# Patient Record
Sex: FEMALE | Race: WHITE
[De-identification: names, ages, dates, MRNs, and addresses within clinical notes are randomized per-mention and may not be internally consistent; named-entity substitution may affect disease eponyms.]

---

## 2021-08-30 ENCOUNTER — HOSPITAL ENCOUNTER (EMERGENCY)
Dept: HOSPITAL 97 - ER | Age: 81
Discharge: HOME | End: 2021-08-30
Payer: COMMERCIAL

## 2021-08-30 VITALS — OXYGEN SATURATION: 97 % | TEMPERATURE: 97.8 F | SYSTOLIC BLOOD PRESSURE: 142 MMHG | DIASTOLIC BLOOD PRESSURE: 85 MMHG

## 2021-08-30 DIAGNOSIS — K62.3: Primary | ICD-10-CM

## 2021-08-30 PROCEDURE — 99281 EMR DPT VST MAYX REQ PHY/QHP: CPT

## 2021-08-30 NOTE — EDPHYS
Physician Documentation                                                                           

 [facility]                                                                 

Name: Charito Mcgee                                                                                  

Age: 80 yrs                                                                                       

Sex: Female                                                                                       

: 1940                                                                                   

MRN: [id number]                                                                                   

Arrival Date: 2021                                                                          

Time: 10:48                                                                                       

Account#: [id number]                                                                            

Bed 11                                                                                            

Private MD: Troy Castellanos T                                                                        

ED Physician Chalo Birmingham                                                                      

HPI:                                                                                              

                                                                                             

14:10 This 80 yrs old  Female presents to ER via Ambulatory with complaints of       kb  

      Vaginal Problem.                                                                            

14:11 The patient presents with ball coming out of vagina. Onset: The symptoms/episode        kb  

      began/occurred 1 week(s) ago. Modifying factors: The symptoms are alleviated by             

      nothing, the symptoms are aggravated by nothing. Associated signs and symptoms: The         

      patient has no apparent associated signs or symptoms. Severity of symptoms: At their        

      worst the symptoms were very mild, in the emergency department the symptoms are             

      unchanged. The patient has not experienced similar symptoms in the past. The patient        

      has not recently seen a physician. Pt states she felt a small ball, the size of a           

      marble, coming out of vagina. States it goes back in on it's own. No pain, tenderness. .    

                                                                                                  

Historical:                                                                                       

- Allergies:                                                                                      

11:26 No Known Allergies;                                                                     hb  

                                                                                                  

- Immunization history:: Client reports receiving the 2nd dose of the Covid vaccine,              

  Date received: 2021.                                                                  

- Social history:: Smoking status: Patient denies any tobacco usage or history of.                

                                                                                                  

                                                                                                  

ROS:                                                                                              

14:08 Constitutional: Negative for fever, chills, and weight loss.                            kb  

14:08 : Positive for ball coming out of vagina intermittently for a week.                       

14:08 All other systems are negative.                                                             

                                                                                                  

Exam:                                                                                             

14:10 Constitutional:  This is a well developed, well nourished patient who is awake, alert,  kb  

      and in no acute distress. Head/Face:  Normocephalic, atraumatic. ENT:  Moist Mucous         

      membranes Respiratory:  Respirations even and unlabored. No increased work of               

      breathing, no retractions or nasal flaring. Abdomen/GI:  Soft, non-tender. No               

      distention Female :  Normal external genitalia. Skin:  Warm, dry with normal turgor.      

      Normal color. MS/ Extremity:  Pulses equal, no cyanosis.  Neurovascular intact.  Full,      

      normal range of motion. Neuro:  Awake and alert, GCS 15, oriented to person, place,         

      time, and situation. Moves all extremities. Normal gait. Psych:  Awake, alert, with         

      orientation to person, place and time.  Behavior, mood, and affect are within normal        

      limits.                                                                                     

14:10 Abdomen/GI: Rectal exam: swelling, is not appreciated, tenderness, is not appreciated,      

      fecal impaction, is not appreciated, the exam is chaperoned by the nurse, rectal            

      prolapse, able to reduce with ease and no pain.                                             

                                                                                                  

Vital Signs:                                                                                      

11:25  / 85; Pulse 68; Resp 16; Temp 97.8; Pulse Ox 97% on R/A; Weight 58.51 kg; Height hb  

      5 ft. 1 in. (154.94 cm); Pain 0/10;                                                         

11:25 Body Mass Index 24.37 (58.51 kg, 154.94 cm)                                             hb  

                                                                                                  

MDM:                                                                                              

13:41 Patient medically screened.                                                             kb  

14:07 Data reviewed: vital signs, nurses notes. Data interpreted: Pulse oximetry: on room air kb  

      is 97 %. Interpretation: normal. Counseling: I had a detailed discussion with the           

      patient and/or guardian regarding: the historical points, exam findings, and any            

      diagnostic results supporting the discharge/admit diagnosis, the need for outpatient        

      follow up, a general surgeon, to return to the emergency department if symptoms worsen      

      or persist or if there are any questions or concerns that arise at home.                    

                                                                                                  

                                                                                             

13:42 Order name: Pelvic Exam Setup; Complete Time: 14:15                                     kb  

                                                                                                  

Administered Medications:                                                                         

No medications were administered                                                                  

                                                                                                  

                                                                                                  

Disposition:                                                                                      

                                                                                             

07:45 Co-signature as Attending Physician, Chalo Birmingham MD I agree with the assessment and  clint 

      plan of care.                                                                               

                                                                                                  

Disposition Summary:                                                                              

21 14:12                                                                                    

Discharge Ordered                                                                                 

      Location: Home                                                                          kb  

      Condition: Stable                                                                       kb  

      Diagnosis                                                                                   

        - Rectal prolapse                                                                     kb  

      Followup:                                                                               kb  

        - With: Emergency Department                                                               

        - When: As needed                                                                          

        - Reason: Worsening of condition                                                           

      Followup:                                                                               kb  

        - With: Private Physician                                                                  

        - When: 2 - 3 days                                                                         

        - Reason: Recheck today's complaints, Continuance of care, Re-evaluation by your           

      physician                                                                                   

      Discharge Instructions:                                                                     

        - Discharge Summary Sheet                                                             kb  

        - Rectal Prolapse, Adult                                                              kb  

      Forms:                                                                                      

        - Medication Reconciliation Form                                                      kb  

        - Thank You Letter                                                                    kb  

        - Antibiotic Education                                                                kb  

        - Prescription Opioid Use                                                             kb  

Signatures:                                                                                       

Mel Mcgrath, JONA SMITH-Chalo Kinsey MD MD cha Baxter, Heather RN                     RN                                                      

                                                                                                  

Corrections: (The following items were deleted from the chart)                                    

                                                                                             

11:27 11:26 Immunization history: Client reports receiving the 2nd dose of the Covid vaccine, hb  

      hb                                                                                          

                                                                                                  

**************************************************************************************************

## 2021-08-30 NOTE — ER
Nurse's Notes                                                                                     

 [facility]                                                                 

Name: Charito Mcgee                                                                                  

Age: 80 yrs                                                                                       

Sex: Female                                                                                       

: 1940                                                                                   

MRN: [id number]                                                                                   

Arrival Date: 2021                                                                          

Time: 10:48                                                                                       

Account#: [id number]                                                                            

Bed 11                                                                                            

Private MD: Troy Castellanos T                                                                        

Diagnosis: Rectal prolapse                                                                        

                                                                                                  

Presentation:                                                                                     

                                                                                             

11:25 Chief complaint: Painless lump the size of a marble near vagina x 1 week. Coronavirus   hb  

      screen: At this time, the client does not indicate any symptoms associated with             

      coronavirus-19. Ebola Screen: No symptoms or risks identified at this time. Initial         

      Sepsis Screen: Does the patient meet any 2 criteria? No. Patient's initial sepsis           

      screen is negative. Does the patient have a suspected source of infection? No.              

      Patient's initial sepsis screen is negative. Risk Assessment: Do you want to hurt           

      yourself or someone else? Patient reports no desire to harm self or others. Onset of        

      symptoms was 2021.                                                               

11:25 Method Of Arrival: Ambulatory                                                           hb  

11:25 Acuity: DARLENE 3                                                                           hb  

                                                                                                  

Historical:                                                                                       

- Allergies:                                                                                      

11:26 No Known Allergies;                                                                     hb  

                                                                                                  

- Immunization history:: Client reports receiving the 2nd dose of the Covid vaccine,              

  Date received: 2021.                                                                  

- Social history:: Smoking status: Patient denies any tobacco usage or history of.                

                                                                                                  

                                                                                                  

Vital Signs:                                                                                      

11:25  / 85; Pulse 68; Resp 16; Temp 97.8; Pulse Ox 97% on R/A; Weight 58.51 kg; Height hb  

      5 ft. 1 in. (154.94 cm); Pain 0/10;                                                         

11:25 Body Mass Index 24.37 (58.51 kg, 154.94 cm)                                             hb  

                                                                                                  

ED Course:                                                                                        

10:48 Patient arrived in ED.                                                                  as  

10:48 Troy Castellanos MD is Private Physician.                                                   as  

11:26 Triage completed.                                                                       hb  

11:26 Arm band placed on.                                                                     hb  

13:41 Mel Mcgrath FNP-C is PHCP.                                                        kb  

13:41 Chalo Birmingham MD is Attending Physician.                                             kb  

14:15 Michelle Mack, RN is Primary Nurse.                                                   iw  

                                                                                                  

Administered Medications:                                                                         

No medications were administered                                                                  

                                                                                                  

                                                                                                  

Outcome:                                                                                          

14:12 Discharge ordered by MD.                                                                kb  

14:25 Patient left the ED.                                                                    iw  

                                                                                                  

Signatures:                                                                                       

Mel Mcgrath FNP-C FNP-Stephania Callahan                             as                                                   

Michelle Mack, RN                     RN   iw                                                   

Rosalee West RN                     RN   hb                                                   

                                                                                                  

Corrections: (The following items were deleted from the chart)                                    

11:27 11:26 Immunization history: Client reports receiving the 2nd dose of the Covid vaccine, hb  

      hb                                                                                          

                                                                                                  

**************************************************************************************************

## 2022-01-22 ENCOUNTER — HOSPITAL ENCOUNTER (EMERGENCY)
Dept: HOSPITAL 97 - ER | Age: 82
Discharge: HOME | End: 2022-01-22
Payer: COMMERCIAL

## 2022-01-22 VITALS — OXYGEN SATURATION: 99 % | SYSTOLIC BLOOD PRESSURE: 153 MMHG | DIASTOLIC BLOOD PRESSURE: 59 MMHG

## 2022-01-22 VITALS — TEMPERATURE: 98.4 F
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
## 2022-01-22 DIAGNOSIS — S00.83XA: Primary | ICD-10-CM

## 2022-01-22 LAB
ALBUMIN SERPL BCP-MCNC: 3.2 G/DL (ref 3.4–5)
ALP SERPL-CCNC: 89 U/L (ref 45–117)
ALT SERPL W P-5'-P-CCNC: 17 U/L (ref 12–78)
AST SERPL W P-5'-P-CCNC: 13 U/L (ref 15–37)
BUN BLD-MCNC: 10 MG/DL (ref 7–18)
GLUCOSE SERPLBLD-MCNC: 79 MG/DL (ref 74–106)
HCT VFR BLD CALC: 38.9 % (ref 36–45)
INR BLD: 1.01
LYMPHOCYTES # SPEC AUTO: 1.3 K/UL (ref 0.7–4.9)
MAGNESIUM SERPL-MCNC: 2.1 MG/DL (ref 1.8–2.4)
NT-PROBNP SERPL-MCNC: 361 PG/ML (ref ?–450)
PMV BLD: 9.4 FL (ref 7.6–11.3)
POTASSIUM SERPL-SCNC: 4 MMOL/L (ref 3.5–5.1)
RBC # BLD: 4.19 M/UL (ref 3.86–4.86)
TROPONIN I SERPL HS-MCNC: 23.1 PG/ML (ref ?–58.9)

## 2022-01-22 PROCEDURE — 85025 COMPLETE CBC W/AUTO DIFF WBC: CPT

## 2022-01-22 PROCEDURE — 71045 X-RAY EXAM CHEST 1 VIEW: CPT

## 2022-01-22 PROCEDURE — 80048 BASIC METABOLIC PNL TOTAL CA: CPT

## 2022-01-22 PROCEDURE — 72125 CT NECK SPINE W/O DYE: CPT

## 2022-01-22 PROCEDURE — 36415 COLL VENOUS BLD VENIPUNCTURE: CPT

## 2022-01-22 PROCEDURE — 84484 ASSAY OF TROPONIN QUANT: CPT

## 2022-01-22 PROCEDURE — 70450 CT HEAD/BRAIN W/O DYE: CPT

## 2022-01-22 PROCEDURE — 81003 URINALYSIS AUTO W/O SCOPE: CPT

## 2022-01-22 PROCEDURE — 83880 ASSAY OF NATRIURETIC PEPTIDE: CPT

## 2022-01-22 PROCEDURE — 81015 MICROSCOPIC EXAM OF URINE: CPT

## 2022-01-22 PROCEDURE — 85610 PROTHROMBIN TIME: CPT

## 2022-01-22 PROCEDURE — 93005 ELECTROCARDIOGRAM TRACING: CPT

## 2022-01-22 PROCEDURE — 99284 EMERGENCY DEPT VISIT MOD MDM: CPT

## 2022-01-22 PROCEDURE — 83735 ASSAY OF MAGNESIUM: CPT

## 2022-01-22 PROCEDURE — 80076 HEPATIC FUNCTION PANEL: CPT

## 2022-01-22 NOTE — EDPHYS
Physician Documentation                                                                           

 [facility]                                                                 

Name: Charito Mcgee                                                                                  

Age: 81 yrs                                                                                       

Sex: Female                                                                                       

: 1940                                                                                   

MRN: [id number]                                                                                   

Arrival Date: 2022                                                                          

Time: 17:24                                                                                       

Account#: [id number]                                                                            

Bed 19                                                                                            

Private MD:                                                                                       

ED Physician Gabriel Webb                                                                         

HPI:                                                                                              

                                                                                             

17:39 This 81 yrs old Female presents to ER via EMS with complaints of Fall Injury.           pm1 

17:39 Details of fall: The patient fell from an upright position, while standing, patient was pm1 

      putting on her jacket over her head and then she started stumbling backwards and fell       

      down hitting the right side of her head on the floor. Patient is not complaining of         

      pain to any other location. No neck pain, LOC. Onset: The symptoms/episode                  

      began/occurred just prior to arrival. Associated injuries: The patient sustained injury     

      to the head, contusion, swelling. Severity of symptoms: in the emergency department the     

      symptoms are unchanged. The patient has experienced similar episodes in the past, a few     

      times, last fell about 1 month ago. Patient reports dizziness on and off for the past       

      month. No dizziness before or after fall today. The patient has not recently seen a         

      physician, the patient's primary care provider is Dr. Castellanos.                                 

                                                                                                  

Historical:                                                                                       

- Allergies:                                                                                      

18:58 No Known Allergies;                                                                     bp  

                                                                                                  

- Immunization history:: Adult Immunizations up to date, Client reports receiving the             

  2nd dose of the Covid vaccine.                                                                  

- Social history:: Smoking status: unknown.                                                       

                                                                                                  

                                                                                                  

ROS:                                                                                              

17:39 Constitutional: Negative for fever, chills, and weight loss, Cardiovascular: Negative   pm1 

      for chest pain, palpitations, and edema, Respiratory: Negative for shortness of breath,     

      cough, wheezing, and pleuritic chest pain, Abdomen/GI: Negative for abdominal pain,         

      nausea, vomiting, diarrhea, and constipation, Back: Negative for injury and pain,           

      MS/Extremity: Negative for injury and deformity, Skin: Negative for injury, rash, and       

      discoloration.                                                                              

17:39 Neuro: Positive for headache, Negative for numbness, tingling, weakness.                    

17:39 All other systems are negative.                                                             

                                                                                                  

Exam:                                                                                             

17:39 Constitutional:  This is a well developed, well nourished patient who is awake, alert,  pm1 

      and in no acute distress. Head/Face:  Normocephalic, atraumatic.                            

17:39 Back:  No spinal tenderness.  No costovertebral tenderness.  Full range of motion.          

      Skin:  Warm, dry with normal turgor.  Normal color with no rashes, no lesions, and no       

      evidence of cellulitis. MS/ Extremity:  Pulses equal, no cyanosis.  Neurovascular           

      intact.  Full, normal range of motion.                                                      

17:39 Head/face: Noted is no obvious of injury or deformity except contusion, that is             

      superficial, of the  right side of the back of head.                                        

17:39 Eyes: Exam is negative for acute changes, Periorbital structures: appear normal,            

      Extraocular movements: no acute changes.                                                    

17:39 ENT: Exam is negative for acute changes, TM's: no acute changes, Mouth: no acute            

      changes, Lips: normal, moist, Oral mucosa: normal, pink and intact, moist.                  

17:39 Cardiovascular: Exam negative for  acute changes, Rate: normal, Rhythm: regular,            

      Pulses: no pulse deficits are appreciated.                                                  

17:39 Respiratory: Exam negative for  acute changes, respiratory distress, shortness of           

      breath, Breath sounds: are clear throughout.                                                

17:39 Neuro: Exam negative for acute changes, Orientation: is normal, Mentation: is normal,       

      Motor: moves all fours.                                                                     

                                                                                                  

Vital Signs:                                                                                      

17:31  / 63; Pulse 65; Resp 18; Temp 98.4; Pulse Ox 100% ; Pain 4/10;                   cb5 

18:30  / 63; Pulse 66; Resp 19; Pulse Ox 99% ;                                          bp  

20:03  / 68; Pulse 67; Resp 17 S; Pulse Ox 98% on R/A;                                  lg3 

21:58  / 59; Pulse 68; Resp 18 S; Pulse Ox 99% on R/A; Pain 0/10;                       lg3 

                                                                                                  

MDM:                                                                                              

17:36 Patient medically screened.                                                             pm1 

21:34 Data reviewed: vital signs. Counseling: I had a detailed discussion with the patient    pm1 

      and/or guardian regarding: the historical points, exam findings, and any diagnostic         

      results supporting the discharge/admit diagnosis, lab results, radiology results, the       

      need for outpatient follow up, pending urine microscopy. Patient does not report any        

      urinary symptoms except for last night some burning with urination.                         

                                                                                                  

                                                                                             

17:37 Order name: Basic Metabolic Panel; Complete Time: 19:17                                 pm1 

                                                                                             

17:37 Order name: CBC with Diff; Complete Time: 19:01                                         pm                                                                                             

17:37 Order name: LFT's; Complete Time: 19:17                                                 pm1 

                                                                                             

17:37 Order name: Magnesium; Complete Time: 19:17                                             pm                                                                                             

17:37 Order name: NT PRO-BNP; Complete Time: 19:17                                            pm                                                                                             

17:37 Order name: PT-INR; Complete Time: 19:17                                                pm1 

                                                                                             

17:37 Order name: CT Head C Spine; Complete Time: 18:30                                       pm                                                                                             

17:37 Order name: Troponin HS; Complete Time: 19:17                                           pm                                                                                             

17:37 Order name: XRAY Chest (1 view); Complete Time: 19:01                                   pm1 

                                                                                             

17:37 Order name: EKG; Complete Time: 17:38                                                   pm                                                                                             

17:37 Order name: Cardiac monitoring; Complete Time: 17:44                                    pm1 

                                                                                             

17:37 Order name: EKG - Nurse/Tech; Complete Time: 18:11                                      pm                                                                                             

20:57 Order name: Urine Dipstick-Ancillary; Complete Time: 20:57                              EDMS

                                                                                             

20:58 Order name: Urine Microscopic Only; Complete Time: 21:38                                pm1 

                                                                                             

17:37 Order name: IV Saline Lock; Complete Time: 18:40                                        pm                                                                                             

17:37 Order name: Labs collected and sent; Complete Time: 18:40                               pm                                                                                             

17:37 Order name: O2 Per Protocol; Complete Time: 17:43                                       pm1 

                                                                                             

17:37 Order name: O2 Sat Monitoring; Complete Time: 17:43                                     pm                                                                                             

20:51 Order name: Urine Dipstick-Ancillary (obtain specimen); Complete Time: 20:51            lg3 

                                                                                                  

Administered Medications:                                                                         

No medications were administered                                                                  

                                                                                                  

                                                                                                  

Disposition:                                                                                      

                                                                                             

07:00 Co-signature as Attending Physician, Gabriel Webb MD.                                    rn  

                                                                                                  

Disposition Summary:                                                                              

22 21:39                                                                                    

Discharge Ordered                                                                                 

      Location: Home                                                                          pm1 

      Problem: new                                                                            pm1 

      Symptoms: have improved                                                                 pm1 

      Condition: Stable                                                                       pm1 

      Diagnosis                                                                                   

        - Fall on same level, unspecified                                                     pm1 

        - Contusion of unspecified part of head                                               pm1 

      Followup:                                                                               pm1 

        - With: Emergency Department                                                               

        - When: As needed                                                                          

        - Reason: Worsening of condition                                                           

      Followup:                                                                               pm1 

        - With: Private Physician                                                                  

        - When: 2 - 3 days                                                                         

        - Reason: Recheck today's complaints, Continuance of care, Re-evaluation by your           

      physician                                                                                   

      Discharge Instructions:                                                                     

        - Discharge Summary Sheet                                                             pm1 

        - Facial or Scalp Contusion                                                           pm1 

        - Fall Prevention in the Home, Adult                                                  pm1 

      Forms:                                                                                      

        - Medication Reconciliation Form                                                      pm1 

        - Thank You Letter                                                                    pm1 

        - Antibiotic Education                                                                pm1 

        - Prescription Opioid Use                                                             pm1 

Signatures:                                                                                       

Dispatcher [employer]                           EDMS                                                 

Gabriel Webb MD MD rn Marinas, Patrick, NP                    NP   pm1                                                  

Shawn Gutierrez RN                      RN   Anupama Estevez RN                       RN   lg3                                                  

Portia Johnson RN                      RN   cb5                                                  

                                                                                                  

**************************************************************************************************

## 2022-01-22 NOTE — RAD REPORT
EXAM DESCRIPTION:  CT - Head C Spine Mpr Wo Con - 1/22/2022 5:50 pm

 

CLINICAL HISTORY:  Head and neck injury status post fall. Head and neck pain

 

COMPARISON:  2016

 

TECHNIQUE:  Computed axial tomography of the head and cervical spine was obtained.

 

Sagittal and coronal reconstruction was performed.

 

All CT scans are performed using dose optimization technique as appropriate and may include automated
 exposure control or mA/KV adjustment according to patient size.

 

FINDINGS:  Right posterior scalp swelling.

 

An intracranial bleed is not seen. The ventricles are normal in caliber. An extra-axial fluid collect
ion is not noted.Fluid within the visualized sinuses and mastoids is not seen

 

A cervical fracture is not visualized. No dislocation is noted.

 

IMPRESSION:  No acute intracranial abnormality is seen.

 

A cervical fracture is not visualized.  If the patient continues to have symptoms to suggest intracra
nial /spinal cord pathology then MRI would be recommended

## 2022-01-22 NOTE — RAD REPORT
EXAM DESCRIPTION:  [place] Single View1/22/2022 6:42 pm

 

CLINICAL HISTORY:  Dizziness

 

COMPARISON:  2016

 

FINDINGS:   The lungs appear clear of acute infiltrate. The heart is normal size

 

IMPRESSION:   No acute abnormalities displayed

## 2022-01-22 NOTE — ER
Nurse's Notes                                                                                     

 [facility]                                                                 

Name: Charito Mcgee                                                                                  

Age: 81 yrs                                                                                       

Sex: Female                                                                                       

: 1940                                                                                   

MRN: [id number]                                                                                   

Arrival Date: 2022                                                                          

Time: 17:24                                                                                       

Account#: [id number]                                                                            

Bed 19                                                                                            

Private MD:                                                                                       

Diagnosis: Fall on same level, unspecified;Contusion of unspecified part of head                  

                                                                                                  

Presentation:                                                                                     

                                                                                             

17:29 Chief complaint: Patient states: fell backwards when she was putting her jacket on and  cb5 

      hit back of head. Mechanism of Injury: Fall.                                                

17:29 Acuity: DARLENE 3                                                                           cb5 

17:29 Method Of Arrival: EMS: [facility]                                                cb5 

22:00 Coronavirus screen: Vaccine status: Patient reports receiving the 2nd dose of the covid lg3 

      vaccine. Client denies travel out of the [place] in the last 14 days. At this time, the        

      client does not indicate any symptoms associated with coronavirus-19. Ebola Screen: No      

      symptoms or risks identified at this time. Initial Sepsis Screen: Does the patient meet     

      any 2 criteria? No. Patient's initial sepsis screen is negative. Does the patient have      

      a suspected source of infection? No. Patient's initial sepsis screen is negative. Risk      

      Assessment: Do you want to hurt yourself or someone else? Patient reports no desire to      

      harm self or others. Onset of symptoms.                                                     

                                                                                                  

Triage Assessment:                                                                                

17:30 General: Appears in no apparent distress. comfortable, well groomed, well nourished,    cb5 

      Behavior is calm, cooperative, appropriate for age. Pain: Complains of pain in back of      

      head Pain currently is 4 out of 10 on a pain scale. Respiratory: No deficits noted.         

      Injury Description: swelling to posterior head, skin intact, redness to site.               

                                                                                                  

Historical:                                                                                       

- Allergies:                                                                                      

18:58 No Known Allergies;                                                                     bp  

                                                                                                  

- Immunization history:: Adult Immunizations up to date, Client reports receiving the             

  2nd dose of the Covid vaccine.                                                                  

- Social history:: Smoking status: unknown.                                                       

                                                                                                  

                                                                                                  

Screenin:58 Abuse screen: Denies threats or abuse. Denies injuries from another. Nutritional        bp  

      screening: No deficits noted. Tuberculosis screening: No symptoms or risk factors           

      identified. Fall Risk None identified.                                                      

                                                                                                  

Assessment:                                                                                       

17:30 General: SEE TRIAGE NOTE.                                                               bp  

20:00 Reassessment: Patient appears in no apparent distress at this time. No changes from     lg3 

      previously documented assessment. Patient and/or family updated on plan of care and         

      expected duration. Pain level reassessed. Patient is alert, oriented x 3, equal             

      unlabored respirations, skin warm/dry/pink. General: Appears in no apparent distress.       

      comfortable, Behavior is calm, cooperative. Pain: Denies pain. Neuro: Level of              

      Consciousness is awake, alert, obeys commands, Oriented to person, place, time,             

      situation. Cardiovascular: Capillary refill < 3 seconds JVD is absent Patient's skin is     

      warm and dry. Respiratory: Airway is patent Trachea midline Respiratory effort is even,     

      unlabored, Respiratory pattern is regular, symmetrical. GI: No deficits noted. No signs     

      and/or symptoms were reported involving the gastrointestinal system. : No deficits        

      noted. No signs and/or symptoms were reported regarding the genitourinary system. EENT:     

      No deficits noted. No signs and/or symptoms were reported regarding the EENT system.        

      Derm: No deficits noted. No signs and/or symptoms reported regarding the dermatologic       

      system. Skin is intact, is healthy with good turgor, Skin is dry. Musculoskeletal: No       

      deficits noted. Circulation, motion, and sensation intact. Range of motion: intact in       

      all extremities.                                                                            

                                                                                                  

Vital Signs:                                                                                      

17:31  / 63; Pulse 65; Resp 18; Temp 98.4; Pulse Ox 100% ; Pain 4/10;                   cb5 

18:30  / 63; Pulse 66; Resp 19; Pulse Ox 99% ;                                          bp  

20:03  / 68; Pulse 67; Resp 17 S; Pulse Ox 98% on R/A;                                  lg3 

21:58  / 59; Pulse 68; Resp 18 S; Pulse Ox 99% on R/A; Pain 0/10;                       lg3 

                                                                                                  

ED Course:                                                                                        

17:24 Patient arrived in ED.                                                                  bp  

17:24 Shawn Gutierrez, RN is Primary Nurse.                                                    bp  

17:27 Gomez Bejarano, FLACO is PHCP.                                                           pm1 

17:27 Gabriel Webb MD is Attending Physician.                                                pm1 

17:29 Triage completed.                                                                       cb5 

17:49 CT Head C Spine In Process Unspecified.                                                 EDMS

18:12 Patient has correct armband on for positive identification. Bed in low position. Call   mh5 

      light in reach. Side rails up X2. Adult w/ patient. Warm blanket given. Cardiac monitor     

      on. Pulse ox on. NIBP on.                                                                   

18:12 EKG done, by ED staff, reviewed by Gomez Bejarano NP.                                  5 

18:15 Maintain EMS IV. Dressing intact. Good blood return noted. Site clean \T\ dry. Gauge \T\    bp

      site: 20 GAUGE R AC.                                                                        

18:42 XRAY Chest (1 view) In Process Unspecified.                                             EDMS

21:29 Urine Microscopic Only Sent.                                                            lg3 

21:59 No provider procedures requiring assistance completed. IV discontinued, intact,         lg3 

      bleeding controlled, Pressure dressing applied.                                             

22:00 Arm band placed on right wrist.                                                         lg3 

                                                                                                  

Administered Medications:                                                                         

No medications were administered                                                                  

                                                                                                  

                                                                                                  

Outcome:                                                                                          

21:39 Discharge ordered by MD.                                                                pm1 

21:59 Discharged to home via wheelchair, with family.                                         lg3 

21:59 Condition: stable                                                                           

21:59 Discharge instructions given to patient, Instructed on discharge instructions, follow       

      up and referral plans. safety practices.                                                    

22:09 Patient left the ED.                                                                    lg3 

                                                                                                  

Signatures:                                                                                       

Dispatcher [employer]                           EDMS                                                 

Gomez Bejarano, FLACO                    NP   pm1                                                  

Susan Moctezuma                              5                                                  

Shawn Gutierrez, RN                      RN   bp                                                   

Anupama Houston, NARCISA                       RN   lg3                                                  

Portia Johnson, RN                      RN   cb5                                                  

                                                                                                  

**************************************************************************************************
<<-----Click on this checkbox to enter Pre-Op Dx

## 2022-01-24 NOTE — EKG
Test Date:    2022-01-22               Test Time:    18:25:55

Technician:   ARCELIA                                    

                                                     

MEASUREMENT RESULTS:                                       

Intervals:                                           

Rate:         61                                     

MT:           170                                    

QRSD:         88                                     

QT:           416                                    

QTc:          418                                    

Axis:                                                

P:            58                                     

MT:           170                                    

QRS:          -32                                    

T:            55                                     

                                                     

INTERPRETIVE STATEMENTS:                                       

                                                     

Normal sinus rhythm

Left axis deviation

Inferior infarct, age undetermined

Anterior infarct, age undetermined

Abnormal ECG

No previous ECG available for comparison



Electronically Signed On 01-24-22 08:03:12 CST by [address] Sharp